# Patient Record
Sex: MALE | Race: WHITE | NOT HISPANIC OR LATINO | Employment: FULL TIME | URBAN - METROPOLITAN AREA
[De-identification: names, ages, dates, MRNs, and addresses within clinical notes are randomized per-mention and may not be internally consistent; named-entity substitution may affect disease eponyms.]

---

## 2020-02-24 PROBLEM — E78.00 HYPERCHOLESTEROLEMIA: Status: ACTIVE | Noted: 2020-02-24

## 2020-02-24 PROBLEM — I25.2 OLD MI (MYOCARDIAL INFARCTION): Status: ACTIVE | Noted: 2020-02-24

## 2021-11-08 ENCOUNTER — CLINICAL SUPPORT (OUTPATIENT)
Dept: OTHER | Facility: CLINIC | Age: 61
End: 2021-11-08
Payer: MEDICAID

## 2021-11-08 DIAGNOSIS — Z00.8 ENCOUNTER FOR OTHER GENERAL EXAMINATION: ICD-10-CM

## 2021-11-09 VITALS — HEIGHT: 68 IN | BODY MASS INDEX: 22.5 KG/M2

## 2021-11-09 LAB
HDLC SERPL-MCNC: 48 MG/DL
POC CHOLESTEROL, LDL (DOCK): 113 MG/DL
POC CHOLESTEROL, TOTAL: 186 MG/DL
POC GLUCOSE, FASTING: 115 MG/DL (ref 60–110)
TRIGL SERPL-MCNC: 123 MG/DL

## 2025-07-03 ENCOUNTER — OFFICE VISIT (OUTPATIENT)
Dept: PRIMARY CARE CLINIC | Facility: CLINIC | Age: 65
End: 2025-07-03
Payer: MEDICAID

## 2025-07-03 VITALS
DIASTOLIC BLOOD PRESSURE: 82 MMHG | HEART RATE: 62 BPM | SYSTOLIC BLOOD PRESSURE: 134 MMHG | WEIGHT: 157.75 LBS | OXYGEN SATURATION: 97 % | HEIGHT: 68 IN | BODY MASS INDEX: 23.91 KG/M2

## 2025-07-03 DIAGNOSIS — Z00.00 ENCOUNTER FOR GENERAL HEALTH EXAMINATION: Primary | ICD-10-CM

## 2025-07-03 DIAGNOSIS — Z12.11 SCREEN FOR COLON CANCER: ICD-10-CM

## 2025-07-03 DIAGNOSIS — I25.2 OLD MI (MYOCARDIAL INFARCTION): ICD-10-CM

## 2025-07-03 DIAGNOSIS — E78.00 HYPERCHOLESTEROLEMIA: ICD-10-CM

## 2025-07-03 DIAGNOSIS — Z23 IMMUNIZATION DUE: ICD-10-CM

## 2025-07-03 PROCEDURE — 1159F MED LIST DOCD IN RCRD: CPT | Mod: CPTII,,,

## 2025-07-03 PROCEDURE — 99999 PR PBB SHADOW E&M-NEW PATIENT-LVL IV: CPT | Mod: PBBFAC,,,

## 2025-07-03 PROCEDURE — 3079F DIAST BP 80-89 MM HG: CPT | Mod: CPTII,,,

## 2025-07-03 PROCEDURE — 99203 OFFICE O/P NEW LOW 30 MIN: CPT | Mod: S$PBB,,,

## 2025-07-03 PROCEDURE — 90471 IMMUNIZATION ADMIN: CPT | Mod: PBBFAC,PN

## 2025-07-03 PROCEDURE — 1160F RVW MEDS BY RX/DR IN RCRD: CPT | Mod: CPTII,,,

## 2025-07-03 PROCEDURE — 90677 PCV20 VACCINE IM: CPT | Mod: PBBFAC,PN

## 2025-07-03 PROCEDURE — 99204 OFFICE O/P NEW MOD 45 MIN: CPT | Mod: PBBFAC,PN

## 2025-07-03 PROCEDURE — 3008F BODY MASS INDEX DOCD: CPT | Mod: CPTII,,,

## 2025-07-03 PROCEDURE — 99999PBSHW PR PBB SHADOW TECHNICAL ONLY FILED TO HB: Mod: PBBFAC,,,

## 2025-07-03 PROCEDURE — 3075F SYST BP GE 130 - 139MM HG: CPT | Mod: CPTII,,,

## 2025-07-03 RX ORDER — ATORVASTATIN CALCIUM 40 MG/1
40 TABLET, FILM COATED ORAL NIGHTLY
Qty: 90 TABLET | Refills: 3 | Status: SHIPPED | OUTPATIENT
Start: 2025-07-03 | End: 2026-07-03

## 2025-07-03 RX ADMIN — PNEUMOCOCCAL 20-VALENT CONJUGATE VACCINE 0.5 ML
2.2; 2.2; 2.2; 2.2; 2.2; 2.2; 2.2; 2.2; 2.2; 2.2; 2.2; 2.2; 2.2; 2.2; 2.2; 2.2; 4.4; 2.2; 2.2; 2.2 INJECTION, SUSPENSION INTRAMUSCULAR at 09:07

## 2025-07-03 NOTE — PROGRESS NOTES
Subjective     Patient ID: Mason Cortse is a 64 y.o. male.    History of Present Illness    CHIEF COMPLAINT:  Patient presents today to establish care and maintain insurance coverage.  Reports want to make sure getting visit in with a doctor so he can keep his insurance along with having doctor close to his job and where he lives.    MEDICAL HISTORY:  He has a history of myocardial infarction in 2020 and hyperlipidemia. He is not currently taking any scheduled medications.    SURGICAL HISTORY:  He has a history of cholecystectomy (gallbladder removal).    ALLERGIES:  He reports an allergy to adhesive.    SOCIAL HISTORY:  He denies current or past tobacco use. His alcohol use was limited to teenage years with no current consumption. He denies any illicit drug use.    DIET:  His diet consists primarily of meat, fish, and salads, with frequent consumption of fried foods. He regularly eats at restaurants and obtains prepared food from retail stores.    REVIEW OF SYSTEMS:  He denies chest pain or shortness of breath. He reports normal urination and sleeping well. He uses reading glasses for computer work and reading small text, but denies blurred vision.    RECENT CARE:  He recently visited Dr. Mara Sierra at [facility]. Labs including CBC, CMP, UACR, and A1C are scheduled for December 15, 2025.      ROS:  General: -fever, -chills, -fatigue, -weight gain, -weight loss  Eyes: -vision changes, -redness, -discharge  ENT: -ear pain, -nasal congestion, -sore throat  Cardiovascular: -chest pain, -palpitations, -lower extremity edema  Respiratory: -cough, -shortness of breath  Gastrointestinal: -abdominal pain, -nausea, -vomiting, -diarrhea, -constipation, -blood in stool  Genitourinary: -dysuria, -hematuria, -frequency  Musculoskeletal: -joint pain, -muscle pain  Skin: -rash, -lesion  Neurological: -headache, -dizziness, -numbness, -tingling  Psychiatric: -anxiety, -depression, -sleep difficulty      "      History reviewed. No pertinent past medical history.  Past Surgical History:   Procedure Laterality Date    CHOLECYSTECTOMY       Social History[1]  Review of patient's allergies indicates:   Allergen Reactions    Adhesive Rash     Problem List[2]         Objective   Vitals:    07/03/25 0837 07/03/25 0840   BP: (!) 146/78 134/82   BP Location:  Left arm   Patient Position:  Sitting   Pulse: 62    SpO2: 97%    Weight: 71.6 kg (157 lb 11.8 oz)    Height: 5' 8" (1.727 m)        Physical Exam  Constitutional:       General: He is not in acute distress.     Appearance: Normal appearance. He is normal weight.   HENT:      Head: Normocephalic and atraumatic.      Right Ear: Tympanic membrane, ear canal and external ear normal.      Left Ear: Tympanic membrane, ear canal and external ear normal.      Nose: Nose normal.      Mouth/Throat:      Mouth: Mucous membranes are moist.      Pharynx: Oropharynx is clear. No oropharyngeal exudate or posterior oropharyngeal erythema.   Eyes:      Extraocular Movements: Extraocular movements intact.      Conjunctiva/sclera: Conjunctivae normal.      Pupils: Pupils are equal, round, and reactive to light.   Cardiovascular:      Rate and Rhythm: Normal rate and regular rhythm.      Pulses: Normal pulses.      Heart sounds: Normal heart sounds.   Pulmonary:      Effort: Pulmonary effort is normal. No respiratory distress.      Breath sounds: Normal breath sounds.   Abdominal:      General: Bowel sounds are normal.      Palpations: Abdomen is soft.   Musculoskeletal:         General: Normal range of motion.      Cervical back: Normal range of motion and neck supple.      Right lower leg: No edema.      Left lower leg: No edema.   Skin:     General: Skin is warm and dry.      Capillary Refill: Capillary refill takes less than 2 seconds.      Findings: No rash.   Neurological:      Mental Status: He is alert and oriented to person, place, and time.   Psychiatric:         Mood and " Affect: Mood normal.         Behavior: Behavior normal.            Assessment and Plan     MYOCARDIAL INFARCTION AND CARDIOVASCULAR HEALTH:  - Assessed cardiovascular risk factors, considering history of MI and elevated cholesterol.  - Discussed relationship between blocked blood vessels and risk of heart attack.  - Advised the patient to follow up with a cardiologist since the myocardial infarction in 2020.    HYPERLIPIDEMIA MANAGEMENT:  - Evaluated need for statin therapy and prescribed atorvastatin for cholesterol management, to be filled at [facility].  - Provided education on cardiovascular health including reducing fried food intake, increasing fruit and vegetable consumption, reducing salt intake, and making healthier food choices.  - Provided materials explaining high cholesterol and heart-healthy diet.  - Recommend patient maintain hydration with water as primary beverage and continue to avoid soda and limit sports drinks.    GENERAL HEALTH AND PREVENTIVE CARE:  - Conducted general health exam.  - Considered appropriate vaccinations based on age and health status and provided educational materials explaining their importance.  - Administered pneumococcal vaccine (Prevnar) in office.  - Recommend obtaining COVID-19, RSV, and shingles vaccines at a retail pharmacy (e.g., [employer], [facility]).  - Discussed importance of colorectal cancer screening and ordered Cologuard per patient's preference  - Also ordered HIV and Hepatitis C testing.    Patient with recent visit at [facility] with Dr. Mara Sierra.  States had lab work done about one month ago.  Per appointment reminder paperwork: scheduled on 12/15/2025 for lab work: CBC, CMP, UACR, A1C and scheduled on 12/22/2025 for BP check and discuss labs.    FOLLOW-UP:  - Complete lab work ordered by Dr. Sierra and attend follow-up visit to discuss results.  - Follow up within a year or sooner if any concerns arise.         1. Encounter for  general health examination  -     Hepatitis C antibody; Future; Expected date: 07/03/2025  -     HIV 1/2 Ag/Ab (4th Gen); Future; Expected date: 07/03/2025    2. Hypercholesterolemia  -     atorvastatin (LIPITOR) 40 MG tablet; Take 1 tablet (40 mg total) by mouth every evening.  Dispense: 90 tablet; Refill: 3    3. Old MI (myocardial infarction): septal by EKG    4. Immunization due  -     pneumoc 20-deborah conj-dip cr(PF) (PREVNAR-20 (PF)) injection Syrg 0.5 mL    5. Screen for colon cancer  -     Cologuard Screening (Multitarget Stool DNA); Future; Expected date: 07/03/2025      No current outpatient medications on file as of 7/3/2025.     Facility-Administered Medications as of 7/3/2025   Medication Dose Route Frequency Provider Last Rate Last Admin    [COMPLETED] pneumoc 20-deborah conj-dip cr(PF) (PREVNAR-20 (PF)) injection Syrg 0.5 mL  0.5 mL Intramuscular 1 time in Clinic/HOD    0.5 mL at 07/03/25 0936           Follow up in about 1 year (around 7/3/2026), or sooner if any concerns.    Lori A. Stephens Sandifer, FNP-C    This note was generated with the assistance of ambient listening technology. Verbal consent was obtained by the patient for the recording of patient appointment to facilitate this note. I attest to having reviewed and edited the generated note for accuracy, though some syntax or spelling errors may persist. Please contact the author of this note for any clarification.         [1]   Social History  Socioeconomic History    Marital status: Single   Tobacco Use    Smoking status: Never   Vaping Use    Vaping status: Never Used   Substance and Sexual Activity    Alcohol use: Not Currently    Drug use: Never   [2]   Patient Active Problem List  Diagnosis    Old MI (myocardial infarction): septal by EKG    Hypercholesterolemia

## 2025-07-03 NOTE — LETTER
July 3, 2025      [facility]  [address]  [address] [address]  Phone: [phone]  Fax: [phone]       Patient: Mason Cortes   YOB: 1960  Date of Visit: 07/03/2025    To Whom It May Concern:    Princess Cortes  was at [facility] on 07/03/2025. The patient may return to work on 07/03/2025 with no restrictions. If you have any questions or concerns, or if I can be of further assistance, please do not hesitate to contact me.    Sincerely,    Lori Ann Stephens Sandifer, NP

## 2025-07-09 ENCOUNTER — PATIENT OUTREACH (OUTPATIENT)
Dept: ADMINISTRATIVE | Facility: OTHER | Age: 65
End: 2025-07-09
Payer: MEDICAID